# Patient Record
Sex: FEMALE | Race: WHITE | NOT HISPANIC OR LATINO | Employment: FULL TIME
[De-identification: names, ages, dates, MRNs, and addresses within clinical notes are randomized per-mention and may not be internally consistent; named-entity substitution may affect disease eponyms.]

---

## 2017-01-24 ENCOUNTER — RECORDS - HEALTHEAST (OUTPATIENT)
Dept: ADMINISTRATIVE | Facility: OTHER | Age: 47
End: 2017-01-24

## 2017-03-08 ENCOUNTER — RECORDS - HEALTHEAST (OUTPATIENT)
Dept: ADMINISTRATIVE | Facility: OTHER | Age: 47
End: 2017-03-08

## 2017-03-08 ENCOUNTER — RECORDS - HEALTHEAST (OUTPATIENT)
Dept: LAB | Facility: CLINIC | Age: 47
End: 2017-03-08

## 2017-03-09 ENCOUNTER — RECORDS - HEALTHEAST (OUTPATIENT)
Dept: ADMINISTRATIVE | Facility: OTHER | Age: 47
End: 2017-03-09

## 2017-03-09 LAB
CHOLEST SERPL-MCNC: 180 MG/DL
FASTING STATUS PATIENT QL REPORTED: NORMAL
HDLC SERPL-MCNC: 62 MG/DL
LDLC SERPL CALC-MCNC: 104 MG/DL
TRIGL SERPL-MCNC: 70 MG/DL

## 2017-03-14 LAB
BKR LAB AP ABNORMAL BLEEDING: NO
BKR LAB AP BIRTH CONTROL/HORMONES: NORMAL
BKR LAB AP CERVICAL APPEARANCE: NORMAL
BKR LAB AP GYN ADEQUACY: NORMAL
BKR LAB AP GYN INTERPRETATION: NORMAL
BKR LAB AP HPV REFLEX: NORMAL
BKR LAB AP LMP: NORMAL
BKR LAB AP PATIENT STATUS: NORMAL
BKR LAB AP PREVIOUS ABNORMAL: NORMAL
BKR LAB AP PREVIOUS NORMAL: NORMAL
HIGH RISK?: NO
HPV INTERPRETATION - HISTORICAL: NORMAL
HPV INTERPRETER - HISTORICAL: NORMAL
PATH REPORT.COMMENTS IMP SPEC: NORMAL
RESULT FLAG (HE HISTORICAL CONVERSION): NORMAL

## 2017-04-18 ENCOUNTER — RECORDS - HEALTHEAST (OUTPATIENT)
Dept: ADMINISTRATIVE | Facility: OTHER | Age: 47
End: 2017-04-18

## 2018-10-26 ENCOUNTER — RECORDS - HEALTHEAST (OUTPATIENT)
Dept: ADMINISTRATIVE | Facility: OTHER | Age: 48
End: 2018-10-26

## 2018-11-02 ENCOUNTER — RECORDS - HEALTHEAST (OUTPATIENT)
Dept: ADMINISTRATIVE | Facility: OTHER | Age: 48
End: 2018-11-02

## 2018-12-03 ENCOUNTER — RECORDS - HEALTHEAST (OUTPATIENT)
Dept: ADMINISTRATIVE | Facility: OTHER | Age: 48
End: 2018-12-03

## 2018-12-12 ENCOUNTER — COMMUNICATION - HEALTHEAST (OUTPATIENT)
Dept: FAMILY MEDICINE | Facility: CLINIC | Age: 48
End: 2018-12-12

## 2018-12-12 DIAGNOSIS — Z12.31 ENCOUNTER FOR SCREENING MAMMOGRAM FOR BREAST CANCER: ICD-10-CM

## 2018-12-26 ENCOUNTER — OFFICE VISIT - HEALTHEAST (OUTPATIENT)
Dept: FAMILY MEDICINE | Facility: CLINIC | Age: 48
End: 2018-12-26

## 2018-12-26 ENCOUNTER — COMMUNICATION - HEALTHEAST (OUTPATIENT)
Dept: TELEHEALTH | Facility: CLINIC | Age: 48
End: 2018-12-26

## 2018-12-26 DIAGNOSIS — N63.21 BREAST LUMP ON LEFT SIDE AT 2 O'CLOCK POSITION: ICD-10-CM

## 2018-12-26 ASSESSMENT — MIFFLIN-ST. JEOR: SCORE: 1351.37

## 2018-12-27 ENCOUNTER — HOSPITAL ENCOUNTER (OUTPATIENT)
Dept: MAMMOGRAPHY | Facility: CLINIC | Age: 48
Discharge: HOME | End: 2018-12-27
Attending: FAMILY MEDICINE

## 2018-12-27 ENCOUNTER — HOSPITAL ENCOUNTER (OUTPATIENT)
Dept: ULTRASOUND IMAGING | Facility: CLINIC | Age: 48
Discharge: HOME | End: 2018-12-27
Attending: FAMILY MEDICINE

## 2018-12-27 DIAGNOSIS — N63.21 BREAST LUMP ON LEFT SIDE AT 2 O'CLOCK POSITION: ICD-10-CM

## 2018-12-31 ENCOUNTER — COMMUNICATION - HEALTHEAST (OUTPATIENT)
Dept: FAMILY MEDICINE | Facility: CLINIC | Age: 48
End: 2018-12-31

## 2019-01-18 ENCOUNTER — OFFICE VISIT - HEALTHEAST (OUTPATIENT)
Dept: FAMILY MEDICINE | Facility: CLINIC | Age: 49
End: 2019-01-18

## 2019-01-18 DIAGNOSIS — F41.1 GENERALIZED ANXIETY DISORDER: ICD-10-CM

## 2019-01-18 DIAGNOSIS — N63.21 BREAST LUMP ON LEFT SIDE AT 2 O'CLOCK POSITION: ICD-10-CM

## 2019-01-18 DIAGNOSIS — K90.0 CELIAC DISEASE: ICD-10-CM

## 2019-01-18 DIAGNOSIS — Z00.00 ROUTINE GENERAL MEDICAL EXAMINATION AT A HEALTH CARE FACILITY: ICD-10-CM

## 2019-01-18 DIAGNOSIS — R53.83 FATIGUE, UNSPECIFIED TYPE: ICD-10-CM

## 2019-01-18 LAB
ALBUMIN SERPL-MCNC: 4.2 G/DL (ref 3.5–5)
ALP SERPL-CCNC: 58 U/L (ref 45–120)
ALT SERPL W P-5'-P-CCNC: 16 U/L (ref 0–45)
ANION GAP SERPL CALCULATED.3IONS-SCNC: 8 MMOL/L (ref 5–18)
AST SERPL W P-5'-P-CCNC: 17 U/L (ref 0–40)
BASOPHILS # BLD AUTO: 0 THOU/UL (ref 0–0.2)
BASOPHILS NFR BLD AUTO: 1 % (ref 0–2)
BILIRUB SERPL-MCNC: 0.5 MG/DL (ref 0–1)
BUN SERPL-MCNC: 13 MG/DL (ref 8–22)
CALCIUM SERPL-MCNC: 9.9 MG/DL (ref 8.5–10.5)
CHLORIDE BLD-SCNC: 104 MMOL/L (ref 98–107)
CHOLEST SERPL-MCNC: 174 MG/DL
CO2 SERPL-SCNC: 28 MMOL/L (ref 22–31)
CREAT SERPL-MCNC: 0.78 MG/DL (ref 0.6–1.1)
EOSINOPHIL # BLD AUTO: 0.1 THOU/UL (ref 0–0.4)
EOSINOPHIL NFR BLD AUTO: 2 % (ref 0–6)
ERYTHROCYTE [DISTWIDTH] IN BLOOD BY AUTOMATED COUNT: 11.6 % (ref 11–14.5)
ESTRADIOL SERPL-MCNC: 88 PG/ML
FASTING STATUS PATIENT QL REPORTED: YES
FSH SERPL-ACNC: 6.7 MIU/ML
GFR SERPL CREATININE-BSD FRML MDRD: >60 ML/MIN/1.73M2
GLUCOSE BLD-MCNC: 94 MG/DL (ref 70–125)
HCT VFR BLD AUTO: 41.9 % (ref 35–47)
HDLC SERPL-MCNC: 67 MG/DL
HGB BLD-MCNC: 13.9 G/DL (ref 12–16)
IGA SERPL-MCNC: 235 MG/DL (ref 65–400)
LDLC SERPL CALC-MCNC: 88 MG/DL
LH SERPL-ACNC: 3.2 MIU/ML
LYMPHOCYTES # BLD AUTO: 1.9 THOU/UL (ref 0.8–4.4)
LYMPHOCYTES NFR BLD AUTO: 29 % (ref 20–40)
MCH RBC QN AUTO: 29.9 PG (ref 27–34)
MCHC RBC AUTO-ENTMCNC: 33.1 G/DL (ref 32–36)
MCV RBC AUTO: 90 FL (ref 80–100)
MONOCYTES # BLD AUTO: 0.4 THOU/UL (ref 0–0.9)
MONOCYTES NFR BLD AUTO: 7 % (ref 2–10)
NEUTROPHILS # BLD AUTO: 3.9 THOU/UL (ref 2–7.7)
NEUTROPHILS NFR BLD AUTO: 61 % (ref 50–70)
PLATELET # BLD AUTO: 290 THOU/UL (ref 140–440)
PMV BLD AUTO: 8.3 FL (ref 7–10)
POTASSIUM BLD-SCNC: 4.7 MMOL/L (ref 3.5–5)
PROGEST SERPL-MCNC: <0.1 NG/ML
PROT SERPL-MCNC: 7.6 G/DL (ref 6–8)
RBC # BLD AUTO: 4.64 MILL/UL (ref 3.8–5.4)
SODIUM SERPL-SCNC: 140 MMOL/L (ref 136–145)
T4 FREE SERPL-MCNC: 1 NG/DL (ref 0.7–1.8)
TRIGL SERPL-MCNC: 97 MG/DL
TSH SERPL DL<=0.005 MIU/L-ACNC: 1.25 UIU/ML (ref 0.3–5)
WBC: 6.4 THOU/UL (ref 4–11)

## 2019-01-18 ASSESSMENT — MIFFLIN-ST. JEOR: SCORE: 1340.03

## 2019-01-18 NOTE — ASSESSMENT & PLAN NOTE
Although there is some asymmetry in the lateral portion of the left breast compared to the right, no mass is palpated.  Asymmetry is most likely related to adipose tissue.  Ultrasound and mammogram recently normal.  Given the patient's level of concern, I did offer evaluation by Dr. Munguia but the patient is not interested in that at this time.  I recommended repeat exam and mammogram in 6 months.  If there is a palpable lesion at that time, we would also do an ultrasound.

## 2019-01-18 NOTE — ASSESSMENT & PLAN NOTE
Patient's daughter and sister both have celiac disease which has been biopsy confirmed.  The patient has never undergone any formal lab tests were endoscopy but does follow a gluten-free diet and has significant improvement of symptoms.  Given her current fatigue and overall malaise you will do a TTG and IgA.  We discussed that if this is normal that does not rule out celiac disease as she is currently on a gluten-free diet.  If it is elevated however it would suggest a more clear diagnosis of celiac disease as well as inadequate vigilance on her diet.  In either case, she will continue to follow a gluten-free diet.

## 2019-01-18 NOTE — ASSESSMENT & PLAN NOTE
MARILYN 7 Total Score: 13 (1/18/2019  9:00 AM)  PHQ-9 Total Score: 8 (1/18/2019  9:00 AM)     The patient does have fairly significant anxiety symptoms that she feels these are related to her overall malaise and fatigue.  If other testing is normal, she would like to consider starting on Wellbutrin which is been helpful for her in the past.  We will follow-up on that once labs are available.

## 2019-01-21 LAB
25(OH)D3 SERPL-MCNC: 38.2 NG/ML (ref 30–80)
25(OH)D3 SERPL-MCNC: 38.2 NG/ML (ref 30–80)
TTG IGA SER-ACNC: 0.3 U/ML
TTG IGG SER-ACNC: <0.6 U/ML

## 2019-01-24 ENCOUNTER — COMMUNICATION - HEALTHEAST (OUTPATIENT)
Dept: FAMILY MEDICINE | Facility: CLINIC | Age: 49
End: 2019-01-24

## 2019-01-24 DIAGNOSIS — R79.89 LOW SERUM PROGESTERONE: ICD-10-CM

## 2019-01-25 ENCOUNTER — COMMUNICATION - HEALTHEAST (OUTPATIENT)
Dept: FAMILY MEDICINE | Facility: CLINIC | Age: 49
End: 2019-01-25

## 2019-01-25 DIAGNOSIS — R53.82 CHRONIC FATIGUE: ICD-10-CM

## 2019-01-25 DIAGNOSIS — F41.1 GENERALIZED ANXIETY DISORDER: ICD-10-CM

## 2019-11-15 ENCOUNTER — COMMUNICATION - HEALTHEAST (OUTPATIENT)
Dept: FAMILY MEDICINE | Facility: CLINIC | Age: 49
End: 2019-11-15

## 2019-11-15 DIAGNOSIS — F41.1 GENERALIZED ANXIETY DISORDER: ICD-10-CM

## 2020-02-12 ENCOUNTER — COMMUNICATION - HEALTHEAST (OUTPATIENT)
Dept: FAMILY MEDICINE | Facility: CLINIC | Age: 50
End: 2020-02-12

## 2020-02-12 DIAGNOSIS — F41.1 GENERALIZED ANXIETY DISORDER: ICD-10-CM

## 2020-05-16 ENCOUNTER — COMMUNICATION - HEALTHEAST (OUTPATIENT)
Dept: FAMILY MEDICINE | Facility: CLINIC | Age: 50
End: 2020-05-16

## 2020-05-16 DIAGNOSIS — F41.1 GENERALIZED ANXIETY DISORDER: ICD-10-CM

## 2020-06-09 ENCOUNTER — OFFICE VISIT - HEALTHEAST (OUTPATIENT)
Dept: FAMILY MEDICINE | Facility: CLINIC | Age: 50
End: 2020-06-09

## 2020-06-09 DIAGNOSIS — Z30.09 GENERAL COUNSELING AND ADVICE FOR CONTRACEPTIVE MANAGEMENT: ICD-10-CM

## 2020-06-09 DIAGNOSIS — F41.1 GENERALIZED ANXIETY DISORDER: ICD-10-CM

## 2020-06-09 DIAGNOSIS — A60.00 HERPES SIMPLEX INFECTION OF GENITOURINARY SYSTEM: ICD-10-CM

## 2020-06-09 ASSESSMENT — ANXIETY QUESTIONNAIRES
6. BECOMING EASILY ANNOYED OR IRRITABLE: NOT AT ALL
3. WORRYING TOO MUCH ABOUT DIFFERENT THINGS: SEVERAL DAYS
GAD7 TOTAL SCORE: 2
5. BEING SO RESTLESS THAT IT IS HARD TO SIT STILL: NOT AT ALL
4. TROUBLE RELAXING: SEVERAL DAYS
1. FEELING NERVOUS, ANXIOUS, OR ON EDGE: NOT AT ALL
7. FEELING AFRAID AS IF SOMETHING AWFUL MIGHT HAPPEN: NOT AT ALL
2. NOT BEING ABLE TO STOP OR CONTROL WORRYING: NOT AT ALL

## 2020-06-09 ASSESSMENT — PATIENT HEALTH QUESTIONNAIRE - PHQ9: SUM OF ALL RESPONSES TO PHQ QUESTIONS 1-9: 1

## 2020-06-09 NOTE — ASSESSMENT & PLAN NOTE
Patient reports rare outbreaks but she has a new partner and want to decrease risk of transmission. After discussing options, she wouldlike to go on daily suppressive therapy. Start valacyclovir 1000 mg daily.

## 2020-06-09 NOTE — ASSESSMENT & PLAN NOTE
The patient's symptoms have been very well controlled and she would like to come off her medication. In reviewing her history, this seems reasonable. She is on a very low dose of bupropion so she was advised to just stop this medication. She was advised to watch for recurrence of symptoms in 2-3 months and to contact me if this occurs.

## 2020-06-15 ENCOUNTER — OFFICE VISIT - HEALTHEAST (OUTPATIENT)
Dept: FAMILY MEDICINE | Facility: CLINIC | Age: 50
End: 2020-06-15

## 2020-06-15 DIAGNOSIS — Z30.430 ENCOUNTER FOR INSERTION OF INTRAUTERINE CONTRACEPTIVE DEVICE: ICD-10-CM

## 2020-06-15 LAB — HCG UR QL: NEGATIVE

## 2020-06-16 LAB
C TRACH DNA SPEC QL PROBE+SIG AMP: NEGATIVE
N GONORRHOEA DNA SPEC QL NAA+PROBE: NEGATIVE

## 2020-10-07 ENCOUNTER — OFFICE VISIT - HEALTHEAST (OUTPATIENT)
Dept: FAMILY MEDICINE | Facility: CLINIC | Age: 50
End: 2020-10-07

## 2020-10-07 ENCOUNTER — COMMUNICATION - HEALTHEAST (OUTPATIENT)
Dept: SCHEDULING | Facility: CLINIC | Age: 50
End: 2020-10-07

## 2020-10-07 DIAGNOSIS — N12 PYELONEPHRITIS: ICD-10-CM

## 2020-10-07 DIAGNOSIS — R30.0 DYSURIA: ICD-10-CM

## 2020-10-07 LAB
ALBUMIN UR-MCNC: NEGATIVE MG/DL
APPEARANCE UR: CLEAR
BACTERIA #/AREA URNS HPF: ABNORMAL HPF
BILIRUB UR QL STRIP: NEGATIVE
COLOR UR AUTO: YELLOW
GLUCOSE UR STRIP-MCNC: NEGATIVE MG/DL
HGB UR QL STRIP: ABNORMAL
KETONES UR STRIP-MCNC: NEGATIVE MG/DL
LEUKOCYTE ESTERASE UR QL STRIP: ABNORMAL
NITRATE UR QL: NEGATIVE
PH UR STRIP: 7 [PH] (ref 5–8)
RBC #/AREA URNS AUTO: ABNORMAL HPF
SP GR UR STRIP: 1.02 (ref 1–1.03)
SQUAMOUS #/AREA URNS AUTO: ABNORMAL LPF
UROBILINOGEN UR STRIP-ACNC: ABNORMAL
WBC #/AREA URNS AUTO: ABNORMAL HPF
WBC CLUMPS #/AREA URNS HPF: PRESENT /[HPF]

## 2020-10-07 NOTE — ASSESSMENT & PLAN NOTE
Kidney infection 7/2020 - positive for e.coli and now has similar symptoms she says she and her boyfriend practice anal sex so this maybe is introducing e.coli into the urinary tract. Pathophysiology discussed.     ua today positive for trace blood and small leukocytes - await uc. Treatment given - bactrim. Will also await culture.

## 2020-10-10 LAB
BACTERIA SPEC CULT: ABNORMAL
BACTERIA SPEC CULT: ABNORMAL

## 2021-05-27 VITALS
DIASTOLIC BLOOD PRESSURE: 80 MMHG | HEART RATE: 84 BPM | SYSTOLIC BLOOD PRESSURE: 124 MMHG | RESPIRATION RATE: 16 BRPM | TEMPERATURE: 99.1 F

## 2021-05-27 ASSESSMENT — PATIENT HEALTH QUESTIONNAIRE - PHQ9: SUM OF ALL RESPONSES TO PHQ QUESTIONS 1-9: 1

## 2021-05-28 ASSESSMENT — ANXIETY QUESTIONNAIRES: GAD7 TOTAL SCORE: 2

## 2021-05-31 ENCOUNTER — COMMUNICATION - HEALTHEAST (OUTPATIENT)
Dept: FAMILY MEDICINE | Facility: CLINIC | Age: 51
End: 2021-05-31

## 2021-05-31 DIAGNOSIS — A60.00 HERPES SIMPLEX INFECTION OF GENITOURINARY SYSTEM: ICD-10-CM

## 2021-06-01 ENCOUNTER — RECORDS - HEALTHEAST (OUTPATIENT)
Dept: ADMINISTRATIVE | Facility: CLINIC | Age: 51
End: 2021-06-01

## 2021-06-01 RX ORDER — VALACYCLOVIR HYDROCHLORIDE 1 G/1
TABLET, FILM COATED ORAL
Qty: 90 TABLET | Refills: 0 | Status: SHIPPED | OUTPATIENT
Start: 2021-06-01 | End: 2021-08-28

## 2021-06-02 VITALS — HEIGHT: 67 IN | BODY MASS INDEX: 24.8 KG/M2 | WEIGHT: 158 LBS

## 2021-06-02 VITALS — HEIGHT: 67 IN | BODY MASS INDEX: 24.4 KG/M2 | WEIGHT: 155.5 LBS

## 2021-06-03 NOTE — TELEPHONE ENCOUNTER
Refill Approved    Rx renewed per Medication Renewal Policy. Medication was last renewed on 1/25/19.    Azeb Salmeron, [facility] Connection Triage/Med Refill 11/16/2019     Requested Prescriptions   Pending Prescriptions Disp Refills     buPROPion (WELLBUTRIN XL) 150 MG 24 hr tablet [Pharmacy Med Name: BUPROPION XL 150MG TABLETS (24 H)] 90 tablet 0     Sig: TAKE 1 TABLET(150 MG) BY MOUTH EVERY MORNING       Tricyclics/Misc Antidepressant/Antianxiety Meds Refill Protocol Passed - 11/15/2019  3:26 AM        Passed - PCP or prescribing provider visit in last year     Last office visit with prescriber/PCP: Visit date not found OR same dept: 12/26/2018 Florence Wynn MD OR same specialty: 12/26/2018 Florence Wynn MD  Last physical: 1/18/2019 Last MTM visit: Visit date not found   Next visit within 3 mo: Visit date not found  Next physical within 3 mo: Visit date not found  Prescriber OR PCP: Erica Pearson MD  Last diagnosis associated with med order: 1. Generalized anxiety disorder  - buPROPion (WELLBUTRIN XL) 150 MG 24 hr tablet [Pharmacy Med Name: BUPROPION XL 150MG TABLETS (24 H)]; TAKE 1 TABLET(150 MG) BY MOUTH EVERY MORNING  Dispense: 90 tablet; Refill: 0    If protocol passes may refill for 12 months if within 3 months of last provider visit (or a total of 15 months).

## 2021-06-04 VITALS — DIASTOLIC BLOOD PRESSURE: 72 MMHG | BODY MASS INDEX: 22.26 KG/M2 | SYSTOLIC BLOOD PRESSURE: 122 MMHG | WEIGHT: 140 LBS

## 2021-06-04 VITALS — WEIGHT: 140 LBS | BODY MASS INDEX: 22.26 KG/M2

## 2021-06-06 NOTE — TELEPHONE ENCOUNTER
RN cannot approve Refill Request    RN can NOT refill this medication PCP messaged that patient is overdue for Office Visit. Last office visit: Visit date not found Last Physical: 1/18/2019 Last MTM visit: Visit date not found Last visit same specialty: 12/26/2018 Florence Wynn MD.  Next visit within 3 mo: Visit date not found  Next physical within 3 mo: Visit date not found      Rashad Perez, [facility] Connection Triage/Med Refill 2/15/2020    Requested Prescriptions   Pending Prescriptions Disp Refills     buPROPion (WELLBUTRIN XL) 150 MG 24 hr tablet [Pharmacy Med Name: BUPROPION XL 150MG TABLETS (24 H)] 90 tablet 0     Sig: TAKE 1 TABLET(150 MG) BY MOUTH EVERY MORNING       Tricyclics/Misc Antidepressant/Antianxiety Meds Refill Protocol Failed - 2/12/2020  3:26 AM        Failed - PCP or prescribing provider visit in last year     Last office visit with prescriber/PCP: Visit date not found OR same dept: Visit date not found OR same specialty: 12/26/2018 Florence Wynn MD  Last physical: 1/18/2019 Last MTM visit: Visit date not found   Next visit within 3 mo: Visit date not found  Next physical within 3 mo: Visit date not found  Prescriber OR PCP: Erica Pearson MD  Last diagnosis associated with med order: 1. Generalized anxiety disorder  - buPROPion (WELLBUTRIN XL) 150 MG 24 hr tablet [Pharmacy Med Name: BUPROPION XL 150MG TABLETS (24 H)]; TAKE 1 TABLET(150 MG) BY MOUTH EVERY MORNING  Dispense: 90 tablet; Refill: 0    If protocol passes may refill for 12 months if within 3 months of last provider visit (or a total of 15 months).

## 2021-06-08 NOTE — PROGRESS NOTES
Insertion of IUD    Date/Time: 6/15/2020 10:30 AM  Performed by: Erica Pearson MD  Authorized by: Erica Pearson MD   Consent: Verbal consent obtained. Written consent obtained.  Risks and benefits: risks, benefits and alternatives were discussed  Consent given by: patient  Patient identity confirmed: verbally with patient  Patient tolerance: Patient tolerated the procedure well with no immediate complications  Comments: IUD Insertion Procedure Note    Pre-operative Diagnosis: Desire for contraception    Post-operative Diagnosis: same    Indications: contraception    History: The patient is requesting IUD for contraception. She is having regular menses with average bleeding. LMP started 6/13/2020.    Procedure Details   Urine pregnancy test was done today and result was negative.  The risks (including infection, bleeding, pain, and uterine perforation) and benefits of the procedure were explained to the patient and written informed consent was obtained.      Speculum placed.  Cervix appears normal.  Cervix cleansed with Betadine. Tenaculum applied to the cervix at 12 O'clock and gentle traction applied.  Cervical os finder not needed. Uterus sounded to 8 cm. IUD inserted following aseptic technique by usual protocol without difficulty. String visible and trimmed to 3 cm. Patient tolerated procedure very well.    IUD Information:  Mirena, Lot # [id number], Expiration date Apr 2022.    Condition:  Stable    Complications:  None    Plan:  The patient was given after care instructions.

## 2021-06-08 NOTE — PATIENT INSTRUCTIONS - HE
1. Stop bupropion. If you develop any withdrawal symptoms, restart and then wean more slowly.  2. Valacyclovir 1000 mg daily for suppression of genital herpes.  3. You are scheduled for IUD placement at 10 AM on Mon Israel 15 in [place]. Please plan to give a urine sample at that time. Please take Ibuprofen 400 mg one hour prior to your visit.

## 2021-06-08 NOTE — TELEPHONE ENCOUNTER
Patient is overdue for med check visit; filled 30 day supply but please schedule virtual visit with her primary

## 2021-06-08 NOTE — PATIENT INSTRUCTIONS - HE
Intrauterine Device Insertion   Patient Instructions    WHAT TO EXPECT AFTER THE PROCEDURE:    Insertion of the IUD may cause some discomfort, such as cramping. The cramping should improve after the IUD is in place. You may have bleeding after the procedure. This is normal. It varies from light spotting for a few days to menstrual-like bleeding.  You may experience irregular bleeding for 3-6 months after IUD is placed and this is normal.  After 6 months, some patients don't have menses at all.  Some patients continue to have menses monthly but they are usually lighter with reduced cramping.  When the IUD is in place, a string will extend past the cervix into the vagina for 1-2 inches. The strings should not bother you or your partner.   HOME CARE INSTRUCTIONS:     1) Ibuprofen 2-3 tabs every 6 hours as needed for pain or cramping.  2) We will notify you of results of Gonorrhea/Chlamydia testing when available.  This test is standard when a IUD is placed.  3) Birth Control:  If IUD was placed in the first 7 days after the start of your menstrual cycle, no back up birth control needed.   4) Schedule a follow up appointment with Dr. Pearson in 4-6 weeks.  5) Check to make sure you can feel the strings once a month.  You should schedule an appointment to be seen if you can't the feel strings.  You should have the strings checked by exam with a healthcare provider at least once per year.  6) The  IUD does NOT protect against sexually transmitted diseases.  You should use condoms if you are at risk of efrain a sexually transmitted disease.  You should be seen promptly if you develop symptoms or have a known exposure.  The best way to reduce risk of STDs is to have a single monogamous relationship.  7) Mild to moderate bleeding and cramping are normal after placement of IUD.  You should be seen if you are having severe symptoms.  8) Please feel free to call with any questions or concerns.  SEEK MEDICAL CARE IF:     You  have bleeding that is heavier or lasts longer than a normal menstrual cycle.    You have a fever.    You have increasing cramps or abdominal pain not relieved with medicine.    You have abdominal pain that does not seem to be related to the same area of earlier cramping and pain.    You are lightheaded, unusually weak, or faint.    You have abnormal vaginal discharge or smells.    You have pain during sexual intercourse.    You cannot feel the IUD strings, or the IUD string has gotten longer.    You feel the IUD at the opening of the cervix in the vagina.    You think you are pregnant, or you miss your menstrual period.    The IUD string is hurting your sex partner.

## 2021-06-08 NOTE — TELEPHONE ENCOUNTER
[facility] message sent to patient.   Lazara Brar, JEFF 5/19/2020 3:04 PM   Priscilla BAUTISTA

## 2021-06-08 NOTE — TELEPHONE ENCOUNTER
RN cannot approve Refill Request    RN can NOT refill this medication Protocol failed and NO refill given.         Azeb Salmeron, [facility] Connection Triage/Med Refill 5/19/2020    Requested Prescriptions   Pending Prescriptions Disp Refills     buPROPion (WELLBUTRIN XL) 150 MG 24 hr tablet [Pharmacy Med Name: BUPROPION XL 150MG TABLETS (24 H)] 90 tablet 3     Sig: TAKE 1 TABLET(150 MG) BY MOUTH EVERY MORNING       Tricyclics/Misc Antidepressant/Antianxiety Meds Refill Protocol Failed - 5/16/2020 12:24 PM        Failed - PCP or prescribing provider visit in last year     Last office visit with prescriber/PCP: Visit date not found OR same dept: Visit date not found OR same specialty: 12/26/2018 Florence Wynn MD  Last physical: 1/18/2019 Last MTM visit: Visit date not found   Next visit within 3 mo: Visit date not found  Next physical within 3 mo: Visit date not found  Prescriber OR PCP: Erica Pearson MD  Last diagnosis associated with med order: 1. Generalized anxiety disorder  - buPROPion (WELLBUTRIN XL) 150 MG 24 hr tablet [Pharmacy Med Name: BUPROPION XL 150MG TABLETS (24 H)]; TAKE 1 TABLET(150 MG) BY MOUTH EVERY MORNING  Dispense: 90 tablet; Refill: 0    If protocol passes may refill for 12 months if within 3 months of last provider visit (or a total of 15 months).

## 2021-06-08 NOTE — PROGRESS NOTES
"Shannon Gordon is a 50 y.o. female who is being evaluated via a billable telephone visit.      The patient has been notified of following:     \"This telephone visit will be conducted via a call between you and your physician/provider. We have found that certain health care needs can be provided without the need for a physical exam.  This service lets us provide the care you need with a short phone conversation.  If a prescription is necessary we can send it directly to your pharmacy.  If lab work is needed we can place an order for that and you can then stop by our lab to have the test done at a later time.    Telephone visits are billed at different rates depending on your insurance coverage. During this emergency period, for some insurers they may be billed the same as an in-person visit.  Please reach out to your insurance provider with any questions.    If during the course of the call the physician/provider feels a telephone visit is not appropriate, you will not be charged for this service.\"    Patient has given verbal consent to a Telephone visit? Yes    What phone number would you like to be contacted at? [phone]    Patient would like to receive their AVS by AVS Preference: Blake.    Shannon Gordon is a 50 y.o. female who is being evaluated via a billable telephone visit. Patient verbally consented to the video service today YES.        HPI: The patient is seen today via virtual phone visit regarding refills on her medications. The patient is wondering about stopping her bupropion. She reports her anxiety is very well controlled. She started the medication during a time of high social stress. She denies any side effects. Although she was on medications many years ago for anxiety as well, she did very well off medications for years in between episodes.    The patient is also wondering about suppressive therapy for genital herpes. She contracted this several years ago and has an outbreak once every " couple of years. No symptoms now. She is in a new relationship and wants to reduce the risk of transmission as much as possible.    The patient would also like to discuss birth control given her new relationship. She is not a smoker, no family or personal history of blood clots or breast cancer. She has a menses once per month.    I have reviewed and updated the patient's Past Medical History, Social History, Family History and Medication List.    ALLERGIES  Gluten      Assessment/Plan:  Problem List Items Addressed This Visit     Generalized anxiety disorder - Primary     The patient's symptoms have been very well controlled and she would like to come off her medication. In reviewing her history, this seems reasonable. She is on a very low dose of bupropion so she was advised to just stop this medication. She was advised to watch for recurrence of symptoms in 2-3 months and to contact me if this occurs.         Herpes simplex infection of genitourinary system     Patient reports rare outbreaks but she has a new partner and want to decrease risk of transmission. After discussing options, she would like to go on daily suppressive therapy. Start valacyclovir 1000 mg daily.         Relevant Medications    valACYclovir (VALTREX) 1000 MG tablet      Other Visit Diagnoses     General counseling and advice for contraceptive management        Multiple options discussed. She has done well with a Mirena IUD in the past and would like to go back to that. Placement scheduled.           Patient Instructions   1. Stop bupropion. If you develop any withdrawal symptoms, restart and then wean more slowly.  2. Valacyclovir 1000 mg daily for suppression of genital herpes.  3. You are scheduled for IUD placement at 10 AM on Mon Israel 15 in [place]. Please plan to give a urine sample at that time. Please take Ibuprofen 400 mg one hour prior to your visit.         Phone call duration:  23 minutes    Erica Pearson MD

## 2021-06-12 NOTE — PROGRESS NOTES
ASSESSMENT AND PLAN:     Problem List Items Addressed This Visit        Unprioritized    Pyelonephritis     Kidney infection 7/2020 - positive for e.coli and now has similar symptoms she says she and her boyfriend practice anal sex so this maybe is introducing e.coli into the urinary tract. Pathophysiology discussed.     ua today positive for trace blood and small leukocytes - await uc. Treatment given - bactrim. Will also await culture.           Relevant Medications    sulfamethoxazole-trimethoprim (BACTRIM DS) 800-160 mg per tablet      Other Visit Diagnoses     Dysuria    -  Primary    Relevant Orders    Urinalysis-UC if Indicated (Completed)    Culture, Urine    Urine,Microscopic           Chief Complaint   Patient presents with     Urinary Tract Infection        HPI  Shannon Gordon is a 50 y.o. female who complains of retention, urinary frequency, and dysuria x 7 days, with bilateral flank pain, no fever, no chills, or abnormal vaginal discharge or bleeding.     Social History     Tobacco Use   Smoking Status Never Smoker   Smokeless Tobacco Never Used      Current Outpatient Medications on File Prior to Visit   Medication Sig Dispense Refill     valACYclovir (VALTREX) 1000 MG tablet Take 1 tablet (1,000 mg total) by mouth daily. 90 tablet 3     [DISCONTINUED] buPROPion (WELLBUTRIN XL) 150 MG 24 hr tablet        levonorgestreL (MIRENA) 20 mcg/24 hours (5 yrs) 52 mg IUD by Intrauterine route once for 1 dose. 1 each 0     No current facility-administered medications on file prior to visit.       Allergies   Allergen Reactions     Gluten        Review of Systems   Constitutional: Negative.  Negative for chills, fatigue and fever.   HENT: Negative.    Eyes: Negative.    Respiratory: Negative.    Cardiovascular: Negative.    Gastrointestinal: Positive for abdominal pain (mild suprapubic pain).   Endocrine: Negative.    Genitourinary: Negative.    Musculoskeletal: Negative.    Skin: Negative.    Neurological:  Negative.    Hematological: Negative.    Psychiatric/Behavioral: Negative.         OBJECTIVE: /80   Pulse 84   Temp 99.1  F (37.3  C) (Oral)   Resp 16     Physical Exam  Constitutional:       General: She is not in acute distress.     Appearance: She is well-developed.   HENT:      Head: Normocephalic and atraumatic.   Eyes:      Conjunctiva/sclera: Conjunctivae normal.   Neck:      Musculoskeletal: Neck supple.   Cardiovascular:      Rate and Rhythm: Normal rate and regular rhythm.      Heart sounds: Normal heart sounds.   Pulmonary:      Effort: Pulmonary effort is normal.      Breath sounds: Normal breath sounds.   Abdominal:      General: Bowel sounds are normal. There is no distension.      Palpations: Abdomen is soft. There is no mass.      Tenderness: There is abdominal tenderness (suprapubic tenderness. ). There is left CVA tenderness. There is no right CVA tenderness, guarding or rebound.      Hernia: No hernia is present.   Musculoskeletal: Normal range of motion.   Skin:     General: Skin is warm and dry.   Neurological:      Mental Status: She is alert and oriented to person, place, and time.          Additional History from Old Records Summarized (2): yes  Decision to Obtain Records (1): yes ([facility] recent pyelo/ ua/ uc)  Radiology Tests Summarized or Ordered (1): no  Labs Reviewed or Ordered (1): yes  Medicine Test Summarized or Ordered (1): yes  Independent Review of EKG or X-RAY(2 each): no    This note was created using Dragon dictation.  Please excuse any grammatical errors.

## 2021-06-12 NOTE — TELEPHONE ENCOUNTER
Shannon is calling and feels that she has a UTI and is having urgency and frequency and fowl smelling urine.  Denies fever cough and shortness of breath.    COVID 19 Nurse Triage Plan/Patient Instructions    Please be aware that novel coronavirus (COVID-19) may be circulating in the community. If you develop symptoms such as fever, cough, or SOB or if you have concerns about the presence of another infection including coronavirus (COVID-19), please contact your health care provider or visit [email or web address].     Disposition/Instructions    Virtual Visit with provider recommended. Reference Visit Selection Guide.    Thank you for taking steps to prevent the spread of this virus.  o Limit your contact with others.  o Wear a simple mask to cover your cough.  o Wash your hands well and often.    Resources    [facility]: About COVID-19: [email or web address]    CDC: What to Do If You're Sick: www.cdc.gov/coronavirus/2019-ncov/about/steps-when-sick.html    CDC: Ending Home Isolation: www.cdc.gov/coronavirus/2019-ncov/hcp/disposition-in-home-patients.html     CDC: Caring for Someone: www.cdc.gov/coronavirus/2019-ncov/if-you-are-sick/care-for-someone.html     [facility]: Interim Guidance for Hospital Discharge to Home: www.health.[facility].[place].us/diseases/coronavirus/hcp/hospdischarge.pdf    [facility] clinical trials (COVID-19 research studies): clinicalaffairs.[facility]-clinical-trials     Below are the COVID-19 hotlines at the [facility] of Health ([facility]). Interpreters are available.   o For health questions: Call 738-788-5198 or 1-589.148.8754 (7 a.m. to 7 p.m.)  o For questions about schools and childcare: Call 176-169-6919 or 1-987.746.5363 (7 a.m. to 7 p.m.)       Reason for Disposition    Urinating more frequently than usual (i.e., frequency)    Additional Information    Negative: Shock suspected (e.g., cold/pale/clammy skin, too weak to stand, low BP, rapid pulse)    Negative: Sounds like a  life-threatening emergency to the triager    Negative: [1] Unable to urinate (or only a few drops) > 4 hours AND     [2] bladder feels very full (e.g., palpable bladder or strong urge to urinate)    Negative: [1] Decreased urination and [2] drinking very little AND [2] dehydration suspected (e.g., dark urine, no urine > 12 hours, very dry mouth, very lightheaded)    Negative: Patient sounds very sick or weak to the triager    Negative: Fever > 100.5 F (38.1 C)    Negative: Side (flank) or lower back pain present    Negative: [1] Can't control passage of urine (i.e., urinary incontinence) AND [2] new onset (< 2 weeks) or worsening    Protocols used: URINARY SYMPTOMS-A-AH

## 2021-06-12 NOTE — TELEPHONE ENCOUNTER
Patient saw  today for a visit to address.   Lazara Brar, JEFF 10/7/2020 5:04 PM   Priscilla BAUTISTA

## 2021-06-15 PROBLEM — K90.0 CELIAC DISEASE: Status: ACTIVE | Noted: 2020-07-17

## 2021-06-16 PROBLEM — Z97.5 IUD (INTRAUTERINE DEVICE) IN PLACE: Status: ACTIVE | Noted: 2020-06-15

## 2021-06-16 PROBLEM — K25.9 GASTRIC ULCER: Status: ACTIVE | Noted: 2019-01-08

## 2021-06-16 PROBLEM — K58.9 IBS (IRRITABLE BOWEL SYNDROME): Status: ACTIVE | Noted: 2019-01-12

## 2021-06-20 ENCOUNTER — HEALTH MAINTENANCE LETTER (OUTPATIENT)
Age: 51
End: 2021-06-20

## 2021-06-23 NOTE — PROGRESS NOTES
FEMALE PREVENTATIVE EXAM    Assessment and Plan:     Problem List Items Addressed This Visit     Breast lump on left side at 2 o'clock position     Although there is some asymmetry in the lateral portion of the left breast compared to the right, no mass is palpated.  Asymmetry is most likely related to adipose tissue.  Ultrasound and mammogram recently normal.  Given the patient's level of concern, I did offer evaluation by Dr. Munguia but the patient is not interested in that at this time.  I recommended repeat exam and mammogram in 6 months.  If there is a palpable lesion at that time, we would also do an ultrasound.         Celiac disease     Patient's daughter and sister both have celiac disease which has been biopsy confirmed.  The patient has never undergone any formal lab tests were endoscopy but does follow a gluten-free diet and has significant improvement of symptoms.  Given her current fatigue and overall malaise you will do a TTG and IgA.  We discussed that if this is normal that does not rule out celiac disease as she is currently on a gluten-free diet.  If it is elevated however it would suggest a more clear diagnosis of celiac disease as well as inadequate vigilance on her diet.  In either case, she will continue to follow a gluten-free diet.         Relevant Orders    Tissue transglutaminase, IgA    Immunoglobulin A (Completed)    Generalized anxiety disorder     MARILYN 7 Total Score: 13 (1/18/2019  9:00 AM)  PHQ-9 Total Score: 8 (1/18/2019  9:00 AM)     The patient does have fairly significant anxiety symptoms that she feels these are related to her overall malaise and fatigue.  If other testing is normal, she would like to consider starting on Wellbutrin which is been helpful for her in the past.  We will follow-up on that once labs are available.           Other Visit Diagnoses     Routine general medical examination at a health care facility    -  Primary    Relevant Orders    Comprehensive Metabolic  Panel (Completed)    Lipid [place] FASTING (Completed)    Fatigue, unspecified type        Relevant Orders    Thyroid Stimulating Hormone (TSH) (Completed)    Vitamin D, Total (25-Hydroxy)    HM1(CBC and Differential) (Completed)    Luteinizing Hormone (LH) (Completed)    Follicle Stimulating Hormone (FSH) (Completed)    Progesterone (Completed)    Estradiol (Completed)    T4, Free (Completed)    HM1 (CBC with Diff) (Completed)         Patient Instructions   1.  We will notify you of lab results.  2.  Please return for recheck of left breast symptoms and possible repeat of mammogram in 6 months.  3.  If labs are normal and symptoms of fatigue and decreased motivation are persistent, we could consider Wellbutrin.         Next follow up:  Return in about 6 months (around 7/18/2019) for Recheck left breast symptoms..    Subjective:   Chief Complaint: Shannon Gordon is an 48 y.o. female here for a preventative health visit.     HPI:  She also has a couple of concerns today. She had noticed an area of fullness in the left breast at the axilla area.  She has not felt a definitive mass.  She did have recent ultrasound and mammogram which were both normal.    She is also complaining of right upper quadrant pain that lasted for about 3 hours 2 weeks ago.  It woke her during the night.  She has not had any recurrence since that time.  She overall she has felt more tired and motivation has been decreased.  She feels like her mood is fair with no specific depression or anxiety symptoms but has found in the past when she has felt this way that Wellbutrin has been helpful in improving her mood and motivation.  She is not currently on any psychiatric medications.  She does wonder about nutritional, hormonal, and blood count issues that might be affecting her overall feeling of well-being.  She reports that she has celiac disease although she has never had formal testing.  She has been following a gluten-free diet for a few years  and is strict and how she follows that.  Her daughter and sister do have celiac disease that has been biopsy confirmed.  She does see a natural path intermittently and multiple other tests including thyroid tests and hormone tests were recommended but she has not yet completed those.  She is wondering if some of those could be done here.  Unfortunately she forgot the list of tests that she is wondering about at home.    Healthy Habits  Are you taking a daily aspirin? No  Do you typically exercising at least 40 min, 3-4 times per week?  NO just signed up at [employer] again  Do you usually eat at least 4 servings of fruit and vegetables a day, include whole grains and fiber and avoid regularly eating high fat foods? NO  Have you had an eye exam in the past two years? Yes  Do you see a dentist twice per year? NO, has an appt  Do you have any concerns regarding sleep? No    Safety Screen  If you own firearms, are they secured in a locked gun cabinet or with trigger locks? NO  Do you feel you are safe where you are living?: Yes (12/26/2018  9:55 AM)  Do you feel you are safe in your relationship(s)?: Yes (12/26/2018  9:55 AM)      Review of Systems:  Please see above.  The rest of the review of systems are negative for all systems.     Cancer Screening       Status Date      MAMMOGRAM Next Due 12/27/2019      Done 12/27/2018 MAMMO DIAGNOSTIC BILATERAL    PAP SMEAR Next Due 3/8/2022      Done 3/8/2017 GYNECOLOGIC CYTOLOGY (PAP SMEAR)    COLONOSCOPY Next Due 12/3/2028      Done 12/3/2018 COLONOSCOPY EXTERNAL RESULT        History     Reviewed By Date/Time Sections Reviewed    Erica Pearson MD 1/18/2019  9:20 AM Tobacco, Alcohol, Drug Use, Sexual Activity, Family    Erica Pearson MD 1/18/2019  9:17 AM Surgical    Emperatriz Perez MA 1/18/2019  8:17 AM Tobacco, Alcohol, Drug Use, Sexual Activity            Objective:   Vital Signs:   Visit Vitals  /84 (Patient Site: Left Arm, Patient Position: Sitting, Cuff  "Size: Adult Regular)   Pulse 80   Ht 5' 6.5\" (1.689 m)   Wt 155 lb 8 oz (70.5 kg)   Breastfeeding? No   BMI 24.72 kg/m       Physical Exam   Constitutional: She is oriented to person, place, and time. She appears well-nourished. No distress.   HENT:   Right Ear: Tympanic membrane and ear canal normal.   Left Ear: Tympanic membrane and ear canal normal.   Mouth/Throat: Oropharynx is clear and moist.   Eyes: Conjunctivae and EOM are normal. Pupils are equal, round, and reactive to light.   Neck: Normal range of motion. Neck supple. Carotid bruit is not present. No thyromegaly present.   Cardiovascular: Normal rate, regular rhythm and normal heart sounds.   No murmur heard.  Pulmonary/Chest: Effort normal and breath sounds normal. She has no wheezes. She has no rales. Right breast exhibits no inverted nipple, no mass and no skin change. Left breast exhibits no inverted nipple, no mass and no skin change.   There is asymmetry in the axilla area with an increased fat pad on the left side but no definitive mass palpable.   Abdominal: Soft. Bowel sounds are normal. She exhibits no distension and no mass. There is no hepatosplenomegaly. There is no tenderness. There is no rebound and no guarding. Hernia confirmed negative in the ventral area.   Musculoskeletal: She exhibits no edema or deformity.   Lymphadenopathy:     She has no cervical adenopathy.   Neurological: She is alert and oriented to person, place, and time. She has normal strength. She displays no tremor. No cranial nerve deficit. She exhibits normal muscle tone. Gait normal.   Reflex Scores:       Patellar reflexes are 2+ on the right side and 2+ on the left side.  Skin: No rash noted.   Psychiatric: She has a normal mood and affect. Her behavior is normal. Judgment and thought content normal.        "

## 2021-06-23 NOTE — PATIENT INSTRUCTIONS - HE
1.  We will notify you of lab results.  2.  Please return for recheck of left breast symptoms and possible repeat of mammogram in 6 months.  3.  If labs are normal and symptoms of fatigue and decreased motivation are persistent, we could consider Wellbutrin.

## 2021-06-23 NOTE — TELEPHONE ENCOUNTER
Test Results  Who is calling?:  Patient  Who ordered the test:  Erica Pearson MD  Type of test: Lab  Date of test:  1/18/2019  Where was the test performed:  Lab  What are your questions/concerns?:  Patient wanting to know the lab results from 1/18/2019.  Please call to advise.  Place labs on [facility] if patient has set up by the time this message is reviewed OR send patient a letter with results.  Okay to leave a detailed message?:  Yes

## 2021-06-23 NOTE — TELEPHONE ENCOUNTER
Question following Office Visit  When did you see your provider: 1/18/19  What is your question: patient calling and would like to try the medication Wellbutrin again. Please send a new prescription to [facility] in [place].  Okay to leave a detailed message: Yes

## 2021-06-25 NOTE — TELEPHONE ENCOUNTER
RN cannot approve Refill Request    RN can NOT refill this medication Protocol failed and NO refill given. Last office visit: 6/15/2020 Erica Pearson MD Last Physical: 1/18/2019 Last MTM visit: Visit date not found Last visit same specialty: 6/15/2020 Erica Pearson MD.  Next visit within 3 mo: Visit date not found  Next physical within 3 mo: Visit date not found      Jolie Williamson, [facility] Triage/Med Refill 5/31/2021    Requested Prescriptions   Pending Prescriptions Disp Refills     valACYclovir (VALTREX) 1000 MG tablet [Pharmacy Med Name: VALACYCLOVIR 1GM TABLETS] 90 tablet 3     Sig: TAKE 1 TABLET(1000 MG) BY MOUTH DAILY       Antivirals Refill Protocol Failed - 5/31/2021  8:22 PM        Failed - Renal function done in last year     Creatinine   Date Value Ref Range Status   01/18/2019 0.78 0.60 - 1.10 mg/dL Final             Passed - Visit with PCP or prescribing provider visit in past 12 months or next 3 months     Last office visit with prescriber/PCP: 6/15/2020 Erica Pearson MD OR same dept: 6/15/2020 Erica Pearson MD OR same specialty: 6/15/2020 Erica Pearson MD  Last physical: 1/18/2019 Last MTM visit: Visit date not found   Next visit within 3 mo: Visit date not found  Next physical within 3 mo: Visit date not found  Prescriber OR PCP: Erica Pearson MD  Last diagnosis associated with med order: 1. Herpes simplex infection of genitourinary system  - valACYclovir (VALTREX) 1000 MG tablet [Pharmacy Med Name: VALACYCLOVIR 1GM TABLETS]; TAKE 1 TABLET(1000 MG) BY MOUTH DAILY  Dispense: 90 tablet; Refill: 3    If protocol passes may refill for 12 months if within 3 months of last provider visit (or a total of 15 months).             Passed - Patient does not have active pregnancy episode        Passed - Patient has not had positive pregnancy test in last 280 days     Pregnancy Test, Urine   Date Value Ref Range Status   06/15/2020 Negative Negative Final

## 2021-06-27 NOTE — PROGRESS NOTES
Progress Notes by Florence Wynn MD at 12/26/2018 10:00 AM     Author: Florence Wynn MD Service: -- Author Type: Physician    Filed: 12/26/2018 10:23 AM Encounter Date: 12/26/2018 Status: Signed    : Florence Wynn MD (Physician)       ASSESSMENT AND PLAN:     Problem List Items Addressed This Visit        Unprioritized    Breast lump on left side at 2 o'clock position     2.5 cm diameter fixed mass noted left breast between 12 o'clock and 3 o'clock position.          Relevant Orders    Mammo Diagnostic Left    US Breast Limited (Focal) Left    US Breast Complete (Whole) Right           Chief Complaint   Patient presents with   ? Breast Problem     Patient would like an order for a mammogram - has a lump.        LUCILLE Gordon is a 48 y.o. female has never had a mammogram before.  She has no family or personal history of breast cancer.  She did have a thermographic scan at some point in the past and can get that record for us.  Today she tells me that she has had a lump in her left upper outer breast for the past year or so.  She feels like it is getting larger.  She feels like it is painful.  She called to get a mammogram scheduled and they told her that with a lump in the breast she would need a physical exam done to document the details about the lump.  This is what has brought her in today.      Social History     Tobacco Use   Smoking Status Never Smoker   Smokeless Tobacco Never Used      Current Outpatient Medications on File Prior to Visit   Medication Sig Dispense Refill   ? pantoprazole (PROTONIX) 40 MG tablet TK 1 T PO D 30 MIN B JERMAINE  6     No current facility-administered medications on file prior to visit.       No Known Allergies      Review of Systems   Constitutional: Negative.    HENT: Negative.    Eyes: Negative.    Respiratory: Negative.    Cardiovascular: Negative.    Gastrointestinal: Negative.    Endocrine: Negative.    Genitourinary: Negative.    Musculoskeletal:  "Negative.    Skin: Negative.    Neurological: Negative.    Hematological: Negative.    Psychiatric/Behavioral: Negative.         OBJECTIVE: /74   Pulse 84   Resp 16   Ht 5' 6.5\" (1.689 m)   Wt 158 lb (71.7 kg)   LMP 12/05/2018 (Approximate)   Breastfeeding? No   BMI 25.12 kg/m      Physical Exam   Constitutional: She is oriented to person, place, and time. She appears well-developed and well-nourished. No distress.   HENT:   Head: Normocephalic and atraumatic.   Eyes: Conjunctivae are normal.   Neck: Neck supple.   Cardiovascular: Normal rate and regular rhythm.   Pulmonary/Chest: Effort normal. Right breast exhibits no inverted nipple, no mass, no nipple discharge, no skin change and no tenderness. Left breast exhibits mass and tenderness. Left breast exhibits no inverted nipple, no nipple discharge and no skin change.   2.5cm diameter mass noted in the left breast between 12 o'clock positon and 3 o'clock position.        Musculoskeletal: Normal range of motion.   Neurological: She is alert and oriented to person, place, and time.   Skin: Skin is warm and dry.   Psychiatric: She has a normal mood and affect.      This note was created using Dragon dictation.  Please excuse any grammatical errors.        "

## 2021-08-27 DIAGNOSIS — A60.00 HERPES SIMPLEX INFECTION OF GENITOURINARY SYSTEM: ICD-10-CM

## 2021-08-28 RX ORDER — VALACYCLOVIR HYDROCHLORIDE 1 G/1
TABLET, FILM COATED ORAL
Qty: 90 TABLET | Refills: 0 | Status: SHIPPED | OUTPATIENT
Start: 2021-08-28 | End: 2021-12-06

## 2021-10-10 ENCOUNTER — HEALTH MAINTENANCE LETTER (OUTPATIENT)
Age: 51
End: 2021-10-10

## 2021-12-06 DIAGNOSIS — A60.00 HERPES SIMPLEX INFECTION OF GENITOURINARY SYSTEM: ICD-10-CM

## 2021-12-06 NOTE — TELEPHONE ENCOUNTER
Pending Prescriptions:                       Disp   Refills    valACYclovir (VALTREX) 1000 mg tablet     90 tab*0            Last filled 8/28/2021

## 2021-12-07 RX ORDER — VALACYCLOVIR HYDROCHLORIDE 1 G/1
1000 TABLET, FILM COATED ORAL DAILY
Qty: 30 TABLET | Refills: 0 | Status: SHIPPED | OUTPATIENT
Start: 2021-12-07 | End: 2022-01-11

## 2021-12-07 NOTE — TELEPHONE ENCOUNTER
"Routing refill request to provider for review/approval because:  Labs out of range:  Creatinine  Patient needs to be seen because it has been more than 1 year since last office visit.     Last Written Prescription Date:  8/28/21  Last Fill Quantity: 90,  # refills: 0   Last office visit provider:  10/7/20     Requested Prescriptions   Pending Prescriptions Disp Refills     valACYclovir (VALTREX) 1000 mg tablet 90 tablet 0       Antivirals for Herpes Protocol Failed - 12/6/2021  7:44 AM        Failed - Recent (12 mo) or future (30 days) visit within the authorizing provider's specialty     Patient has had an office visit with the authorizing provider or a provider within the authorizing providers department within the previous 12 mos or has a future within next 30 days. See \"Patient Info\" tab in inbasket, or \"Choose Columns\" in Meds & Orders section of the refill encounter.              Failed - Normal serum creatinine on file in past 12 months     Recent Labs   Lab Test 01/18/19  0951   CR 0.78       Ok to refill medication if creatinine is low          Passed - Patient is age 12 or older        Passed - Medication is active on med list             Demond Rinaldi RN 12/07/21 2:14 PM  "

## 2022-01-07 DIAGNOSIS — A60.00 HERPES SIMPLEX INFECTION OF GENITOURINARY SYSTEM: ICD-10-CM

## 2022-01-10 NOTE — TELEPHONE ENCOUNTER
"Routing refill request to provider for review/approval because:  Labs not current:  CR  Patient needs to be seen because it has been more than 1 year since last office visit.    Last Written Prescription Date:  12/7/21  Last Fill Quantity: 30,  # refills: 0   Last office visit provider:  10/7/2020    Requested Prescriptions   Pending Prescriptions Disp Refills     valACYclovir (VALTREX) 1000 mg tablet [Pharmacy Med Name: VALACYCLOVIR 1GM TABLETS] 30 tablet 0     Sig: TAKE 1 TABLET(1000 MG) BY MOUTH DAILY       Antivirals for Herpes Protocol Failed - 1/7/2022  3:26 PM        Failed - Recent (12 mo) or future (30 days) visit within the authorizing provider's specialty     Patient has had an office visit with the authorizing provider or a provider within the authorizing providers department within the previous 12 mos or has a future within next 30 days. See \"Patient Info\" tab in inbasket, or \"Choose Columns\" in Meds & Orders section of the refill encounter.              Failed - Normal serum creatinine on file in past 12 months     Recent Labs   Lab Test 01/18/19  0951   CR 0.78       Ok to refill medication if creatinine is low          Passed - Patient is age 12 or older        Passed - Medication is active on med list             Hanny Beauchamp RN 01/10/22 3:21 PM  "

## 2022-01-11 PROBLEM — N63.21 BREAST LUMP ON LEFT SIDE AT 2 O'CLOCK POSITION: Status: ACTIVE | Noted: 2018-12-26

## 2022-01-11 PROBLEM — A60.00 HERPES SIMPLEX INFECTION OF GENITOURINARY SYSTEM: Status: ACTIVE | Noted: 2020-06-09

## 2022-01-11 PROBLEM — F41.1 GENERALIZED ANXIETY DISORDER: Status: ACTIVE | Noted: 2019-01-18

## 2022-01-11 PROBLEM — N12 PYELONEPHRITIS: Status: ACTIVE | Noted: 2020-10-07

## 2022-01-11 RX ORDER — VALACYCLOVIR HYDROCHLORIDE 1 G/1
TABLET, FILM COATED ORAL
Qty: 90 TABLET | Refills: 0 | Status: SHIPPED | OUTPATIENT
Start: 2022-01-11

## 2022-03-23 ENCOUNTER — HOSPITAL ENCOUNTER (OUTPATIENT)
Dept: CT IMAGING | Facility: CLINIC | Age: 52
Discharge: HOME | End: 2022-03-23
Attending: UROLOGY | Admitting: UROLOGY
Payer: COMMERCIAL

## 2022-03-23 DIAGNOSIS — R10.9 ACUTE RIGHT FLANK PAIN: ICD-10-CM

## 2022-03-23 PROCEDURE — 74176 CT ABD & PELVIS W/O CONTRAST: CPT

## 2022-07-16 ENCOUNTER — HEALTH MAINTENANCE LETTER (OUTPATIENT)
Age: 52
End: 2022-07-16

## 2022-09-18 ENCOUNTER — HEALTH MAINTENANCE LETTER (OUTPATIENT)
Age: 52
End: 2022-09-18

## 2023-07-30 ENCOUNTER — HEALTH MAINTENANCE LETTER (OUTPATIENT)
Age: 53
End: 2023-07-30

## 2024-09-22 ENCOUNTER — HEALTH MAINTENANCE LETTER (OUTPATIENT)
Age: 54
End: 2024-09-22

## 2024-10-23 ENCOUNTER — LAB REQUISITION (OUTPATIENT)
Dept: LAB | Facility: CLINIC | Age: 54
End: 2024-10-23
Payer: COMMERCIAL

## 2024-10-23 DIAGNOSIS — Z01.419 ENCOUNTER FOR GYNECOLOGICAL EXAMINATION (GENERAL) (ROUTINE) WITHOUT ABNORMAL FINDINGS: ICD-10-CM

## 2024-10-23 DIAGNOSIS — Z13.220 ENCOUNTER FOR SCREENING FOR LIPOID DISORDERS: ICD-10-CM

## 2024-10-23 PROCEDURE — 87624 HPV HI-RISK TYP POOLED RSLT: CPT | Mod: ORL | Performed by: STUDENT IN AN ORGANIZED HEALTH CARE EDUCATION/TRAINING PROGRAM

## 2024-10-23 PROCEDURE — G0145 SCR C/V CYTO,THINLAYER,RESCR: HCPCS | Mod: ORL | Performed by: STUDENT IN AN ORGANIZED HEALTH CARE EDUCATION/TRAINING PROGRAM

## 2024-10-23 PROCEDURE — 80061 LIPID PANEL: CPT | Mod: ORL | Performed by: STUDENT IN AN ORGANIZED HEALTH CARE EDUCATION/TRAINING PROGRAM

## 2024-10-24 LAB
CHOLEST SERPL-MCNC: 159 MG/DL
FASTING STATUS PATIENT QL REPORTED: NORMAL
HDLC SERPL-MCNC: 71 MG/DL
LDLC SERPL CALC-MCNC: 76 MG/DL
NONHDLC SERPL-MCNC: 88 MG/DL
TRIGL SERPL-MCNC: 58 MG/DL

## 2024-10-25 LAB
HPV HR 12 DNA CVX QL NAA+PROBE: POSITIVE
HPV16 DNA CVX QL NAA+PROBE: NEGATIVE
HPV18 DNA CVX QL NAA+PROBE: NEGATIVE
HUMAN PAPILLOMA VIRUS FINAL DIAGNOSIS: ABNORMAL

## 2024-10-30 LAB
BKR AP ASSOCIATED HPV REPORT: ABNORMAL
BKR LAB AP GYN ADEQUACY: ABNORMAL
BKR LAB AP GYN INTERPRETATION: ABNORMAL
BKR LAB AP LMP: ABNORMAL
BKR LAB AP PREVIOUS ABNL DX: ABNORMAL
BKR LAB AP PREVIOUS ABNORMAL: ABNORMAL
PATH REPORT.COMMENTS IMP SPEC: ABNORMAL
PATH REPORT.COMMENTS IMP SPEC: ABNORMAL
PATH REPORT.RELEVANT HX SPEC: ABNORMAL

## 2024-10-30 PROCEDURE — 88141 CYTOPATH C/V INTERPRET: CPT | Performed by: PATHOLOGY
